# Patient Record
Sex: MALE | Race: WHITE | URBAN - METROPOLITAN AREA
[De-identification: names, ages, dates, MRNs, and addresses within clinical notes are randomized per-mention and may not be internally consistent; named-entity substitution may affect disease eponyms.]

---

## 2022-01-01 ENCOUNTER — HOSPITAL ENCOUNTER (INPATIENT)
Age: 62
LOS: 1 days | DRG: 045 | End: 2022-06-03
Attending: EMERGENCY MEDICINE | Admitting: SURGERY
Payer: MEDICAID

## 2022-01-01 ENCOUNTER — APPOINTMENT (OUTPATIENT)
Dept: GENERAL RADIOLOGY | Age: 62
DRG: 045 | End: 2022-01-01
Payer: MEDICAID

## 2022-01-01 ENCOUNTER — APPOINTMENT (OUTPATIENT)
Dept: CT IMAGING | Age: 62
DRG: 045 | End: 2022-01-01
Payer: MEDICAID

## 2022-01-01 VITALS
BODY MASS INDEX: 29.52 KG/M2 | OXYGEN SATURATION: 98 % | HEIGHT: 74 IN | WEIGHT: 230 LBS | DIASTOLIC BLOOD PRESSURE: 61 MMHG | TEMPERATURE: 97.7 F | RESPIRATION RATE: 24 BRPM | SYSTOLIC BLOOD PRESSURE: 103 MMHG | HEART RATE: 88 BPM

## 2022-01-01 DIAGNOSIS — I63.9 ACUTE CEREBROVASCULAR ACCIDENT (HCC): Primary | ICD-10-CM

## 2022-01-01 LAB
AADO2: 310.1 MMHG
AADO2: 315 MMHG
AADO2: 321.8 MMHG
AADO2: 336.4 MMHG
ABO/RH: NORMAL
ACETAMINOPHEN LEVEL: 5.1 MCG/ML (ref 10–30)
ALBUMIN SERPL-MCNC: 2.9 G/DL (ref 3.5–5.2)
ALBUMIN SERPL-MCNC: 3.6 G/DL (ref 3.5–5.2)
ALP BLD-CCNC: 72 U/L (ref 40–129)
ALP BLD-CCNC: 72 U/L (ref 40–129)
ALT SERPL-CCNC: 110 U/L (ref 0–40)
ALT SERPL-CCNC: 1308 U/L (ref 0–40)
AMORPHOUS: ABNORMAL
AMPHETAMINE SCREEN, URINE: NOT DETECTED
ANGLE (CLOT STRENGTH): 64.1 DEGREE (ref 59–74)
ANION GAP SERPL CALCULATED.3IONS-SCNC: 15 MMOL/L (ref 7–16)
ANION GAP SERPL CALCULATED.3IONS-SCNC: 22 MMOL/L (ref 7–16)
ANISOCYTOSIS: ABNORMAL
ANTIBODY SCREEN: NORMAL
APTT: 31 SEC (ref 24.5–35.1)
AST SERPL-CCNC: 2531 U/L (ref 0–39)
AST SERPL-CCNC: 277 U/L (ref 0–39)
B.E.: -13.5 MMOL/L (ref -3–3)
B.E.: -13.9 MMOL/L (ref -3–3)
B.E.: -14.3 MMOL/L (ref -3–3)
B.E.: -15.3 MMOL/L (ref -3–3)
B.E.: -15.6 MMOL/L (ref -3–3)
BACTERIA: ABNORMAL /HPF
BARBITURATE SCREEN URINE: NOT DETECTED
BASOPHILS ABSOLUTE: 0 E9/L (ref 0–0.2)
BASOPHILS RELATIVE PERCENT: 0.5 % (ref 0–2)
BENZODIAZEPINE SCREEN, URINE: POSITIVE
BILIRUB SERPL-MCNC: 0.2 MG/DL (ref 0–1.2)
BILIRUB SERPL-MCNC: 1.2 MG/DL (ref 0–1.2)
BILIRUBIN URINE: NEGATIVE
BLOOD, URINE: ABNORMAL
BUN BLDV-MCNC: 58 MG/DL (ref 6–23)
BUN BLDV-MCNC: 69 MG/DL (ref 6–23)
BURR CELLS: ABNORMAL
CALCIUM IONIZED: 1.04 MMOL/L (ref 1.15–1.33)
CALCIUM IONIZED: 1.09 MMOL/L (ref 1.15–1.33)
CALCIUM SERPL-MCNC: 7.2 MG/DL (ref 8.6–10.2)
CALCIUM SERPL-MCNC: 7.8 MG/DL (ref 8.6–10.2)
CANNABINOID SCREEN URINE: NOT DETECTED
CHLORIDE BLD-SCNC: 111 MMOL/L (ref 98–107)
CHLORIDE BLD-SCNC: 116 MMOL/L (ref 98–107)
CLARITY: CLEAR
CO2: 15 MMOL/L (ref 22–29)
CO2: 19 MMOL/L (ref 22–29)
COCAINE METABOLITE SCREEN URINE: NOT DETECTED
COHB: 0.2 % (ref 0–1.5)
COHB: 0.3 % (ref 0–1.5)
COHB: 0.5 % (ref 0–1.5)
COHB: 0.5 % (ref 0–1.5)
COHB: 0.7 % (ref 0–1.5)
COLOR: YELLOW
CREAT SERPL-MCNC: 5.6 MG/DL (ref 0.7–1.2)
CREAT SERPL-MCNC: 7.2 MG/DL (ref 0.7–1.2)
CREATININE URINE: 274 MG/DL (ref 40–278)
CRITICAL: ABNORMAL
DATE ANALYZED: ABNORMAL
DATE OF COLLECTION: ABNORMAL
EOSINOPHILS ABSOLUTE: 0 E9/L (ref 0.05–0.5)
EOSINOPHILS RELATIVE PERCENT: 0.1 % (ref 0–6)
EPL-TEG: 0 % (ref 0–15)
ETHANOL: <10 MG/DL (ref 0–0.08)
FENTANYL SCREEN, URINE: POSITIVE
FIO2: 100 %
FIO2: 70 %
G-TEG: 8.8 K D/SC (ref 4.5–11)
GFR AFRICAN AMERICAN: 12
GFR AFRICAN AMERICAN: 9
GFR NON-AFRICAN AMERICAN: 10 ML/MIN/1.73
GFR NON-AFRICAN AMERICAN: 8 ML/MIN/1.73
GLUCOSE BLD-MCNC: 236 MG/DL (ref 74–99)
GLUCOSE BLD-MCNC: 239 MG/DL (ref 74–99)
GLUCOSE URINE: 250 MG/DL
HBA1C MFR BLD: 9.1 % (ref 4–5.6)
HCO3: 14 MMOL/L (ref 22–26)
HCO3: 14.3 MMOL/L (ref 22–26)
HCO3: 14.4 MMOL/L (ref 22–26)
HCO3: 14.4 MMOL/L (ref 22–26)
HCO3: 17.6 MMOL/L (ref 22–26)
HCT VFR BLD CALC: 44.7 % (ref 37–54)
HCT VFR BLD CALC: 45 % (ref 37–54)
HEMOGLOBIN: 13.3 G/DL (ref 12.5–16.5)
HEMOGLOBIN: 13.5 G/DL (ref 12.5–16.5)
HHB: 1.1 % (ref 0–5)
HHB: 2 % (ref 0–5)
HHB: 2.3 % (ref 0–5)
HHB: 3.2 % (ref 0–5)
HHB: 3.5 % (ref 0–5)
INR BLD: 1.4
K (CLOTTING TIME): 1.9 MIN (ref 1–3)
KETONES, URINE: ABNORMAL MG/DL
LAB: ABNORMAL
LACTIC ACID: 2.5 MMOL/L (ref 0.5–2.2)
LACTIC ACID: 3.7 MMOL/L (ref 0.5–2.2)
LACTIC ACID: 4.8 MMOL/L (ref 0.5–2.2)
LEUKOCYTE ESTERASE, URINE: ABNORMAL
LY30 (FIBRINOLYSIS): 0 % (ref 0–8)
LYMPHOCYTES ABSOLUTE: 0.94 E9/L (ref 1.5–4)
LYMPHOCYTES RELATIVE PERCENT: 6.9 % (ref 20–42)
Lab: ABNORMAL
MA (MAX AMPLITUDE): 63.8 MM (ref 50–70)
MAGNESIUM: 2.4 MG/DL (ref 1.6–2.6)
MAGNESIUM: 2.5 MG/DL (ref 1.6–2.6)
MCH RBC QN AUTO: 25.8 PG (ref 26–35)
MCH RBC QN AUTO: 26.2 PG (ref 26–35)
MCHC RBC AUTO-ENTMCNC: 29.6 % (ref 32–34.5)
MCHC RBC AUTO-ENTMCNC: 30.2 % (ref 32–34.5)
MCV RBC AUTO: 86.8 FL (ref 80–99.9)
MCV RBC AUTO: 87.4 FL (ref 80–99.9)
METAMYELOCYTES RELATIVE PERCENT: 0.9 % (ref 0–1)
METER GLUCOSE: 187 MG/DL (ref 74–99)
METER GLUCOSE: 237 MG/DL (ref 74–99)
METER GLUCOSE: 290 MG/DL (ref 74–99)
METHADONE SCREEN, URINE: NOT DETECTED
METHB: 0.4 % (ref 0–1.5)
METHB: 0.5 % (ref 0–1.5)
METHB: 0.8 % (ref 0–1.5)
MODE: AC
MONOCYTES ABSOLUTE: 0.54 E9/L (ref 0.1–0.95)
MONOCYTES RELATIVE PERCENT: 4.3 % (ref 2–12)
NEUTROPHILS ABSOLUTE: 11.93 E9/L (ref 1.8–7.3)
NEUTROPHILS RELATIVE PERCENT: 87.9 % (ref 43–80)
NITRITE, URINE: NEGATIVE
NUCLEATED RED BLOOD CELLS: 3.4 /100 WBC
O2 CONTENT: 20.1 ML/DL
O2 SATURATION: 96.5 % (ref 92–98.5)
O2 SATURATION: 96.8 % (ref 92–98.5)
O2 SATURATION: 97.7 % (ref 92–98.5)
O2 SATURATION: 98 % (ref 92–98.5)
O2 SATURATION: 98.9 % (ref 92–98.5)
O2HB: 95.8 % (ref 94–97)
O2HB: 95.8 % (ref 94–97)
O2HB: 96.5 % (ref 94–97)
O2HB: 97.1 % (ref 94–97)
O2HB: 97.8 % (ref 94–97)
OPERATOR ID: 1721
OPERATOR ID: 1721
OPERATOR ID: 2021
OPERATOR ID: 421
OPERATOR ID: ABNORMAL
OPIATE SCREEN URINE: POSITIVE
OXYCODONE URINE: NOT DETECTED
PATIENT TEMP: 37 C
PCO2: 40 MMHG (ref 35–45)
PCO2: 44.1 MMHG (ref 35–45)
PCO2: 48 MMHG (ref 35–45)
PCO2: 50.3 MMHG (ref 35–45)
PCO2: 64.3 MMHG (ref 35–45)
PDW BLD-RTO: 14.7 FL (ref 11.5–15)
PDW BLD-RTO: 14.9 FL (ref 11.5–15)
PEEP/CPAP: 5 CMH2O
PEEP/CPAP: 8 CMH2O
PFO2: 1.4 MMHG/%
PFO2: 1.51 MMHG/%
PFO2: 1.64 MMHG/%
PFO2: 1.84 MMHG/%
PFO2: 1.95 MMHG/%
PH BLOOD GAS: 7.05 (ref 7.35–7.45)
PH BLOOD GAS: 7.08 (ref 7.35–7.45)
PH BLOOD GAS: 7.09 (ref 7.35–7.45)
PH BLOOD GAS: 7.13 (ref 7.35–7.45)
PH BLOOD GAS: 7.16 (ref 7.35–7.45)
PH UA: 5 (ref 5–9)
PHENCYCLIDINE SCREEN URINE: NOT DETECTED
PHOSPHORUS: 6.7 MG/DL (ref 2.5–4.5)
PHOSPHORUS: 7.6 MG/DL (ref 2.5–4.5)
PLATELET # BLD: 144 E9/L (ref 130–450)
PLATELET # BLD: 28 E9/L (ref 130–450)
PLATELET CONFIRMATION: NORMAL
PMV BLD AUTO: 11.8 FL (ref 7–12)
PMV BLD AUTO: 9.6 FL (ref 7–12)
PO2: 105.7 MMHG (ref 75–100)
PO2: 115 MMHG (ref 75–100)
PO2: 128.5 MMHG (ref 75–100)
PO2: 195.1 MMHG (ref 75–100)
PO2: 97.8 MMHG (ref 75–100)
POIKILOCYTES: ABNORMAL
POLYCHROMASIA: ABNORMAL
POTASSIUM REFLEX MAGNESIUM: 3.7 MMOL/L (ref 3.5–5)
POTASSIUM SERPL-SCNC: 4.25 MMOL/L (ref 3.5–5)
POTASSIUM SERPL-SCNC: 4.5 MMOL/L (ref 3.5–5)
PROTEIN UA: >=300 MG/DL
PROTHROMBIN TIME: 15.7 SEC (ref 9.3–12.4)
R (REACTION TIME): 4.6 MIN (ref 5–10)
RBC # BLD: 5.15 E12/L (ref 3.8–5.8)
RBC # BLD: 5.15 E12/L (ref 3.8–5.8)
RBC UA: ABNORMAL /HPF (ref 0–2)
RI(T): 2.41
RI(T): 2.74
RI(T): 3.04
RI(T): 3.44
RR MECHANICAL: 20 B/MIN
RR MECHANICAL: 24 B/MIN
SALICYLATE, SERUM: <0.3 MG/DL (ref 0–30)
SODIUM BLD-SCNC: 144 MMOL/L (ref 132–146)
SODIUM BLD-SCNC: 145 MMOL/L (ref 132–146)
SODIUM BLD-SCNC: 146 MMOL/L (ref 132–146)
SODIUM BLD-SCNC: 153 MMOL/L (ref 132–146)
SODIUM URINE: 57 MMOL/L
SOURCE, BLOOD GAS: ABNORMAL
SPECIFIC GRAVITY UA: >=1.03 (ref 1–1.03)
THB: 13.4 G/DL (ref 11.5–16.5)
THB: 14.2 G/DL (ref 11.5–16.5)
THB: 14.3 G/DL (ref 11.5–16.5)
THB: 14.8 G/DL (ref 11.5–16.5)
THB: 14.8 G/DL (ref 11.5–16.5)
TIME ANALYZED: 1550
TIME ANALYZED: 2050
TIME ANALYZED: 531
TIME ANALYZED: 9
TIME ANALYZED: 955
TOTAL PROTEIN: 5.8 G/DL (ref 6.4–8.3)
TOTAL PROTEIN: 6.8 G/DL (ref 6.4–8.3)
TRICYCLIC ANTIDEPRESSANTS SCREEN SERUM: NEGATIVE NG/ML
UROBILINOGEN, URINE: 0.2 E.U./DL
VANCOMYCIN RANDOM: <4 MCG/ML (ref 5–40)
VT MECHANICAL: 500 ML
WBC # BLD: 13.4 E9/L (ref 4.5–11.5)
WBC # BLD: 28.2 E9/L (ref 4.5–11.5)
WBC UA: ABNORMAL /HPF (ref 0–5)

## 2022-01-01 PROCEDURE — 80307 DRUG TEST PRSMV CHEM ANLYZR: CPT

## 2022-01-01 PROCEDURE — 82077 ASSAY SPEC XCP UR&BREATH IA: CPT

## 2022-01-01 PROCEDURE — 82570 ASSAY OF URINE CREATININE: CPT

## 2022-01-01 PROCEDURE — 94002 VENT MGMT INPAT INIT DAY: CPT

## 2022-01-01 PROCEDURE — 84100 ASSAY OF PHOSPHORUS: CPT

## 2022-01-01 PROCEDURE — 80202 ASSAY OF VANCOMYCIN: CPT

## 2022-01-01 PROCEDURE — 87081 CULTURE SCREEN ONLY: CPT

## 2022-01-01 PROCEDURE — 6360000002 HC RX W HCPCS: Performed by: STUDENT IN AN ORGANIZED HEALTH CARE EDUCATION/TRAINING PROGRAM

## 2022-01-01 PROCEDURE — 80179 DRUG ASSAY SALICYLATE: CPT

## 2022-01-01 PROCEDURE — 71045 X-RAY EXAM CHEST 1 VIEW: CPT

## 2022-01-01 PROCEDURE — 2580000003 HC RX 258: Performed by: STUDENT IN AN ORGANIZED HEALTH CARE EDUCATION/TRAINING PROGRAM

## 2022-01-01 PROCEDURE — 82962 GLUCOSE BLOOD TEST: CPT

## 2022-01-01 PROCEDURE — 85027 COMPLETE CBC AUTOMATED: CPT

## 2022-01-01 PROCEDURE — 82330 ASSAY OF CALCIUM: CPT

## 2022-01-01 PROCEDURE — 6370000000 HC RX 637 (ALT 250 FOR IP): Performed by: SURGERY

## 2022-01-01 PROCEDURE — 80143 DRUG ASSAY ACETAMINOPHEN: CPT

## 2022-01-01 PROCEDURE — 2500000003 HC RX 250 WO HCPCS: Performed by: STUDENT IN AN ORGANIZED HEALTH CARE EDUCATION/TRAINING PROGRAM

## 2022-01-01 PROCEDURE — 2580000003 HC RX 258: Performed by: SURGERY

## 2022-01-01 PROCEDURE — 86850 RBC ANTIBODY SCREEN: CPT

## 2022-01-01 PROCEDURE — 85025 COMPLETE CBC W/AUTO DIFF WBC: CPT

## 2022-01-01 PROCEDURE — 2500000003 HC RX 250 WO HCPCS: Performed by: SURGERY

## 2022-01-01 PROCEDURE — 96365 THER/PROPH/DIAG IV INF INIT: CPT

## 2022-01-01 PROCEDURE — 83735 ASSAY OF MAGNESIUM: CPT

## 2022-01-01 PROCEDURE — 72125 CT NECK SPINE W/O DYE: CPT

## 2022-01-01 PROCEDURE — 0BH17EZ INSERTION OF ENDOTRACHEAL AIRWAY INTO TRACHEA, VIA NATURAL OR ARTIFICIAL OPENING: ICD-10-PCS | Performed by: SURGERY

## 2022-01-01 PROCEDURE — 5A1935Z RESPIRATORY VENTILATION, LESS THAN 24 CONSECUTIVE HOURS: ICD-10-PCS | Performed by: SURGERY

## 2022-01-01 PROCEDURE — 85384 FIBRINOGEN ACTIVITY: CPT

## 2022-01-01 PROCEDURE — 36620 INSERTION CATHETER ARTERY: CPT

## 2022-01-01 PROCEDURE — 99232 SBSQ HOSP IP/OBS MODERATE 35: CPT | Performed by: NEUROLOGICAL SURGERY

## 2022-01-01 PROCEDURE — 80053 COMPREHEN METABOLIC PANEL: CPT

## 2022-01-01 PROCEDURE — 99222 1ST HOSP IP/OBS MODERATE 55: CPT | Performed by: NEUROLOGICAL SURGERY

## 2022-01-01 PROCEDURE — 85610 PROTHROMBIN TIME: CPT

## 2022-01-01 PROCEDURE — 82805 BLOOD GASES W/O2 SATURATION: CPT

## 2022-01-01 PROCEDURE — 6370000000 HC RX 637 (ALT 250 FOR IP): Performed by: STUDENT IN AN ORGANIZED HEALTH CARE EDUCATION/TRAINING PROGRAM

## 2022-01-01 PROCEDURE — 02HV33Z INSERTION OF INFUSION DEVICE INTO SUPERIOR VENA CAVA, PERCUTANEOUS APPROACH: ICD-10-PCS | Performed by: SURGERY

## 2022-01-01 PROCEDURE — 05HN33Z INSERTION OF INFUSION DEVICE INTO LEFT INTERNAL JUGULAR VEIN, PERCUTANEOUS APPROACH: ICD-10-PCS | Performed by: SURGERY

## 2022-01-01 PROCEDURE — 99285 EMERGENCY DEPT VISIT HI MDM: CPT

## 2022-01-01 PROCEDURE — 37799 UNLISTED PX VASCULAR SURGERY: CPT

## 2022-01-01 PROCEDURE — 86900 BLOOD TYPING SEROLOGIC ABO: CPT

## 2022-01-01 PROCEDURE — 99223 1ST HOSP IP/OBS HIGH 75: CPT | Performed by: SURGERY

## 2022-01-01 PROCEDURE — 84295 ASSAY OF SERUM SODIUM: CPT

## 2022-01-01 PROCEDURE — 36415 COLL VENOUS BLD VENIPUNCTURE: CPT

## 2022-01-01 PROCEDURE — 86901 BLOOD TYPING SEROLOGIC RH(D): CPT

## 2022-01-01 PROCEDURE — 72170 X-RAY EXAM OF PELVIS: CPT

## 2022-01-01 PROCEDURE — 84132 ASSAY OF SERUM POTASSIUM: CPT

## 2022-01-01 PROCEDURE — 83036 HEMOGLOBIN GLYCOSYLATED A1C: CPT

## 2022-01-01 PROCEDURE — 83605 ASSAY OF LACTIC ACID: CPT

## 2022-01-01 PROCEDURE — 6810039001 HC L1 TRAUMA PRIORITY

## 2022-01-01 PROCEDURE — 94003 VENT MGMT INPAT SUBQ DAY: CPT

## 2022-01-01 PROCEDURE — 84300 ASSAY OF URINE SODIUM: CPT

## 2022-01-01 PROCEDURE — 81001 URINALYSIS AUTO W/SCOPE: CPT

## 2022-01-01 PROCEDURE — 36556 INSERT NON-TUNNEL CV CATH: CPT

## 2022-01-01 PROCEDURE — 85347 COAGULATION TIME ACTIVATED: CPT

## 2022-01-01 PROCEDURE — 87088 URINE BACTERIA CULTURE: CPT

## 2022-01-01 PROCEDURE — 85730 THROMBOPLASTIN TIME PARTIAL: CPT

## 2022-01-01 PROCEDURE — 70450 CT HEAD/BRAIN W/O DYE: CPT

## 2022-01-01 PROCEDURE — 71250 CT THORAX DX C-: CPT

## 2022-01-01 PROCEDURE — 6360000002 HC RX W HCPCS: Performed by: SURGERY

## 2022-01-01 PROCEDURE — 74176 CT ABD & PELVIS W/O CONTRAST: CPT

## 2022-01-01 PROCEDURE — 2000000000 HC ICU R&B

## 2022-01-01 PROCEDURE — 99238 HOSP IP/OBS DSCHRG MGMT 30/<: CPT | Performed by: SURGERY

## 2022-01-01 PROCEDURE — 85576 BLOOD PLATELET AGGREGATION: CPT

## 2022-01-01 PROCEDURE — 87040 BLOOD CULTURE FOR BACTERIA: CPT

## 2022-01-01 PROCEDURE — 99291 CRITICAL CARE FIRST HOUR: CPT | Performed by: SURGERY

## 2022-01-01 RX ORDER — ONDANSETRON 4 MG/1
4 TABLET, ORALLY DISINTEGRATING ORAL EVERY 8 HOURS PRN
Status: DISCONTINUED | OUTPATIENT
Start: 2022-01-01 | End: 2022-01-01 | Stop reason: HOSPADM

## 2022-01-01 RX ORDER — DOCUSATE SODIUM 50 MG/5ML
100 LIQUID ORAL DAILY
Status: DISCONTINUED | OUTPATIENT
Start: 2022-01-01 | End: 2022-01-01

## 2022-01-01 RX ORDER — 3% SODIUM CHLORIDE 3 G/100ML
35 INJECTION, SOLUTION INTRAVENOUS CONTINUOUS
Status: DISCONTINUED | OUTPATIENT
Start: 2022-01-01 | End: 2022-01-01

## 2022-01-01 RX ORDER — 0.9 % SODIUM CHLORIDE 0.9 %
500 INTRAVENOUS SOLUTION INTRAVENOUS ONCE
Status: COMPLETED | OUTPATIENT
Start: 2022-01-01 | End: 2022-01-01

## 2022-01-01 RX ORDER — VASOPRESSIN 20 U/ML
INJECTION PARENTERAL
Status: DISCONTINUED
Start: 2022-01-01 | End: 2022-01-01

## 2022-01-01 RX ORDER — POLYVINYL ALCOHOL 14 MG/ML
1 SOLUTION/ DROPS OPHTHALMIC EVERY 4 HOURS
Status: DISCONTINUED | OUTPATIENT
Start: 2022-01-01 | End: 2022-01-01 | Stop reason: HOSPADM

## 2022-01-01 RX ORDER — GLYCOPYRROLATE 0.2 MG/ML
0.2 INJECTION INTRAMUSCULAR; INTRAVENOUS EVERY 4 HOURS PRN
Status: DISCONTINUED | OUTPATIENT
Start: 2022-01-01 | End: 2022-01-01 | Stop reason: HOSPADM

## 2022-01-01 RX ORDER — ONDANSETRON 2 MG/ML
4 INJECTION INTRAMUSCULAR; INTRAVENOUS EVERY 6 HOURS PRN
Status: DISCONTINUED | OUTPATIENT
Start: 2022-01-01 | End: 2022-01-01 | Stop reason: HOSPADM

## 2022-01-01 RX ORDER — POLYETHYLENE GLYCOL 3350 17 G/17G
17 POWDER, FOR SOLUTION ORAL DAILY
Status: DISCONTINUED | OUTPATIENT
Start: 2022-01-01 | End: 2022-01-01

## 2022-01-01 RX ORDER — SODIUM CHLORIDE 0.9 % (FLUSH) 0.9 %
10 SYRINGE (ML) INJECTION PRN
Status: DISCONTINUED | OUTPATIENT
Start: 2022-01-01 | End: 2022-01-01

## 2022-01-01 RX ORDER — LEVETIRACETAM 100 MG/ML
500 SOLUTION ORAL 2 TIMES DAILY
Status: DISCONTINUED | OUTPATIENT
Start: 2022-01-01 | End: 2022-01-01

## 2022-01-01 RX ORDER — SODIUM CHLORIDE 9 MG/ML
INJECTION, SOLUTION INTRAVENOUS PRN
Status: DISCONTINUED | OUTPATIENT
Start: 2022-01-01 | End: 2022-01-01 | Stop reason: HOSPADM

## 2022-01-01 RX ORDER — INSULIN LISPRO 100 [IU]/ML
0-18 INJECTION, SOLUTION INTRAVENOUS; SUBCUTANEOUS EVERY 4 HOURS
Status: DISCONTINUED | OUTPATIENT
Start: 2022-01-01 | End: 2022-01-01

## 2022-01-01 RX ORDER — 0.9 % SODIUM CHLORIDE 0.9 %
1000 INTRAVENOUS SOLUTION INTRAVENOUS ONCE
Status: DISCONTINUED | OUTPATIENT
Start: 2022-01-01 | End: 2022-01-01

## 2022-01-01 RX ORDER — MINERAL OIL AND WHITE PETROLATUM 150; 830 MG/G; MG/G
OINTMENT OPHTHALMIC EVERY 4 HOURS
Status: DISCONTINUED | OUTPATIENT
Start: 2022-01-01 | End: 2022-01-01 | Stop reason: HOSPADM

## 2022-01-01 RX ORDER — DEXTROSE MONOHYDRATE 50 MG/ML
INJECTION, SOLUTION INTRAVENOUS
Status: COMPLETED
Start: 2022-01-01 | End: 2022-01-01

## 2022-01-01 RX ORDER — SODIUM CHLORIDE 9 MG/ML
INJECTION, SOLUTION INTRAVENOUS CONTINUOUS
Status: DISCONTINUED | OUTPATIENT
Start: 2022-01-01 | End: 2022-01-01

## 2022-01-01 RX ORDER — 3% SODIUM CHLORIDE 3 G/100ML
INJECTION, SOLUTION INTRAVENOUS CONTINUOUS PRN
Status: COMPLETED | OUTPATIENT
Start: 2022-01-01 | End: 2022-01-01

## 2022-01-01 RX ORDER — POLYVINYL ALCOHOL 14 MG/ML
1 SOLUTION/ DROPS OPHTHALMIC EVERY 4 HOURS PRN
Status: DISCONTINUED | OUTPATIENT
Start: 2022-01-01 | End: 2022-01-01

## 2022-01-01 RX ORDER — SODIUM CHLORIDE 0.9 % (FLUSH) 0.9 %
10 SYRINGE (ML) INJECTION EVERY 12 HOURS SCHEDULED
Status: DISCONTINUED | OUTPATIENT
Start: 2022-01-01 | End: 2022-01-01 | Stop reason: HOSPADM

## 2022-01-01 RX ORDER — CHLORHEXIDINE GLUCONATE 0.12 MG/ML
15 RINSE ORAL 2 TIMES DAILY
Status: DISCONTINUED | OUTPATIENT
Start: 2022-01-01 | End: 2022-01-01

## 2022-01-01 RX ORDER — ACETAMINOPHEN 160 MG/5ML
650 SOLUTION ORAL EVERY 4 HOURS PRN
Status: DISCONTINUED | OUTPATIENT
Start: 2022-01-01 | End: 2022-01-01 | Stop reason: HOSPADM

## 2022-01-01 RX ORDER — FAMOTIDINE 20 MG/1
20 TABLET, FILM COATED ORAL DAILY
Status: DISCONTINUED | OUTPATIENT
Start: 2022-01-01 | End: 2022-01-01

## 2022-01-01 RX ORDER — DEXTROSE MONOHYDRATE 50 MG/ML
100 INJECTION, SOLUTION INTRAVENOUS PRN
Status: DISCONTINUED | OUTPATIENT
Start: 2022-01-01 | End: 2022-01-01

## 2022-01-01 RX ADMIN — SODIUM CHLORIDE: 9 INJECTION, SOLUTION INTRAVENOUS at 18:39

## 2022-01-01 RX ADMIN — SODIUM CHLORIDE: 9 INJECTION, SOLUTION INTRAVENOUS at 07:21

## 2022-01-01 RX ADMIN — SODIUM CHLORIDE 500 ML: 9 INJECTION, SOLUTION INTRAVENOUS at 07:22

## 2022-01-01 RX ADMIN — EPINEPHRINE 15 MCG/MIN: 1 INJECTION PARENTERAL at 07:02

## 2022-01-01 RX ADMIN — SODIUM BICARBONATE: 84 INJECTION, SOLUTION INTRAVENOUS at 10:42

## 2022-01-01 RX ADMIN — SODIUM BICARBONATE 100 MEQ: 84 INJECTION, SOLUTION INTRAVENOUS at 00:19

## 2022-01-01 RX ADMIN — INSULIN LISPRO 9 UNITS: 100 INJECTION, SOLUTION INTRAVENOUS; SUBCUTANEOUS at 21:20

## 2022-01-01 RX ADMIN — VASOPRESSIN 0.04 UNITS/MIN: 20 INJECTION INTRAVENOUS at 19:01

## 2022-01-01 RX ADMIN — POLYVINYL ALCOHOL 1 DROP: 14 SOLUTION/ DROPS OPHTHALMIC at 10:05

## 2022-01-01 RX ADMIN — SODIUM BICARBONATE 50 MEQ: 84 INJECTION INTRAVENOUS at 21:11

## 2022-01-01 RX ADMIN — CEFEPIME HYDROCHLORIDE 1000 MG: 1 INJECTION, POWDER, FOR SOLUTION INTRAMUSCULAR; INTRAVENOUS at 04:28

## 2022-01-01 RX ADMIN — POLYVINYL ALCOHOL 1 DROP: 14 SOLUTION/ DROPS OPHTHALMIC at 04:26

## 2022-01-01 RX ADMIN — CHLORHEXIDINE GLUCONATE 15 ML: 1.2 RINSE ORAL at 10:07

## 2022-01-01 RX ADMIN — PIPERACILLIN SODIUM AND TAZOBACTAM SODIUM 4500 MG: 4; .5 INJECTION, POWDER, LYOPHILIZED, FOR SOLUTION INTRAVENOUS at 15:58

## 2022-01-01 RX ADMIN — ACETAMINOPHEN ORAL SOLUTION 650 MG: 650 SOLUTION ORAL at 21:09

## 2022-01-01 RX ADMIN — MINERAL OIL AND WHITE PETROLATUM: 150; 830 OINTMENT OPHTHALMIC at 05:39

## 2022-01-01 RX ADMIN — Medication 20 MG: at 21:09

## 2022-01-01 RX ADMIN — EPINEPHRINE 10 MCG/MIN: 1 INJECTION PARENTERAL at 15:01

## 2022-01-01 RX ADMIN — INSULIN LISPRO 3 UNITS: 100 INJECTION, SOLUTION INTRAVENOUS; SUBCUTANEOUS at 04:30

## 2022-01-01 RX ADMIN — NOREPINEPHRINE BITARTRATE 100 MCG/MIN: 1 INJECTION, SOLUTION, CONCENTRATE INTRAVENOUS at 22:00

## 2022-01-01 RX ADMIN — NOREPINEPHRINE BITARTRATE 100 MCG/MIN: 1 INJECTION, SOLUTION, CONCENTRATE INTRAVENOUS at 19:01

## 2022-01-01 RX ADMIN — EPINEPHRINE 15 MCG/MIN: 1 INJECTION PARENTERAL at 00:56

## 2022-01-01 RX ADMIN — SODIUM CHLORIDE 500 ML: 9 INJECTION, SOLUTION INTRAVENOUS at 18:54

## 2022-01-01 RX ADMIN — SODIUM BICARBONATE: 84 INJECTION, SOLUTION INTRAVENOUS at 21:58

## 2022-01-01 RX ADMIN — NOREPINEPHRINE BITARTRATE 50 MCG/MIN: 1 INJECTION, SOLUTION, CONCENTRATE INTRAVENOUS at 09:50

## 2022-01-01 RX ADMIN — CALCIUM GLUCONATE 4000 MG: 98 INJECTION, SOLUTION INTRAVENOUS at 07:47

## 2022-01-01 RX ADMIN — NOREPINEPHRINE BITARTRATE 95 MCG/MIN: 1 INJECTION, SOLUTION, CONCENTRATE INTRAVENOUS at 00:57

## 2022-01-01 RX ADMIN — CHLORHEXIDINE GLUCONATE 15 ML: 1.2 RINSE ORAL at 21:00

## 2022-01-01 RX ADMIN — EPINEPHRINE 10 MCG/MIN: 1 INJECTION PARENTERAL at 19:03

## 2022-01-01 RX ADMIN — SODIUM CHLORIDE 35 ML/HR: 3 INJECTION, SOLUTION INTRAVENOUS at 15:36

## 2022-01-01 RX ADMIN — MINERAL OIL AND WHITE PETROLATUM: 150; 830 OINTMENT OPHTHALMIC at 10:05

## 2022-01-01 RX ADMIN — NOREPINEPHRINE BITARTRATE 75 MCG/MIN: 1 INJECTION, SOLUTION, CONCENTRATE INTRAVENOUS at 04:24

## 2022-01-01 RX ADMIN — CALCIUM GLUCONATE 2000 MG: 98 INJECTION, SOLUTION INTRAVENOUS at 21:14

## 2022-01-01 RX ADMIN — SODIUM BICARBONATE: 84 INJECTION, SOLUTION INTRAVENOUS at 10:15

## 2022-01-01 RX ADMIN — INSULIN LISPRO 6 UNITS: 100 INJECTION, SOLUTION INTRAVENOUS; SUBCUTANEOUS at 19:10

## 2022-01-01 RX ADMIN — INSULIN LISPRO 6 UNITS: 100 INJECTION, SOLUTION INTRAVENOUS; SUBCUTANEOUS at 00:40

## 2022-01-01 RX ADMIN — VASOPRESSIN 0.04 UNITS/MIN: 20 INJECTION INTRAVENOUS at 18:46

## 2022-01-01 RX ADMIN — INSULIN LISPRO 6 UNITS: 100 INJECTION, SOLUTION INTRAVENOUS; SUBCUTANEOUS at 08:40

## 2022-01-01 RX ADMIN — Medication 100 MCG/MIN: at 14:55

## 2022-01-01 RX ADMIN — VASOPRESSIN 0.04 UNITS/MIN: 20 INJECTION INTRAVENOUS at 03:23

## 2022-01-01 RX ADMIN — SODIUM CHLORIDE 35 ML/HR: 3 INJECTION, SOLUTION INTRAVENOUS at 21:54

## 2022-01-01 RX ADMIN — POLYVINYL ALCOHOL 1 DROP: 14 SOLUTION/ DROPS OPHTHALMIC at 05:39

## 2022-01-01 RX ADMIN — SODIUM BICARBONATE 50 MEQ: 84 INJECTION, SOLUTION INTRAVENOUS at 06:26

## 2022-01-01 RX ADMIN — SODIUM CHLORIDE 250 ML: 3 INJECTION, SOLUTION INTRAVENOUS at 15:02

## 2022-01-01 RX ADMIN — Medication 105 MCG/MIN: at 17:55

## 2022-01-01 RX ADMIN — MINERAL OIL AND WHITE PETROLATUM: 150; 830 OINTMENT OPHTHALMIC at 02:00

## 2022-01-01 ASSESSMENT — PULMONARY FUNCTION TESTS
PIF_VALUE: 22
PIF_VALUE: 23
PIF_VALUE: 22
PIF_VALUE: 22
PIF_VALUE: 23
PIF_VALUE: 22
PIF_VALUE: 22
PIF_VALUE: 23
PIF_VALUE: 22
PIF_VALUE: 22
PIF_VALUE: 23
PIF_VALUE: 22
PIF_VALUE: 22
PIF_VALUE: 23
PIF_VALUE: 23
PIF_VALUE: 22
PIF_VALUE: 23
PIF_VALUE: 22
PIF_VALUE: 23
PIF_VALUE: 22
PIF_VALUE: 23
PIF_VALUE: 22
PIF_VALUE: 22
PIF_VALUE: 23
PIF_VALUE: 23
PIF_VALUE: 22
PIF_VALUE: 23
PIF_VALUE: 22

## 2022-01-01 ASSESSMENT — PAIN SCALES - GENERAL
PAINLEVEL_OUTOF10: 0

## 2022-06-02 PROBLEM — I63.9 ACUTE CEREBROVASCULAR ACCIDENT (HCC): Status: ACTIVE | Noted: 2022-01-01

## 2022-06-02 PROBLEM — I62.9 INTRACRANIAL HEMORRHAGE (HCC): Status: ACTIVE | Noted: 2022-01-01

## 2022-06-02 PROBLEM — T14.90XA TRAUMA: Status: ACTIVE | Noted: 2022-01-01

## 2022-06-02 NOTE — ED NOTES
Pt log-rolled while maintaining c-spine precautions. No-step-off, deformities, or signs of trauma.  No rectal tone     Jace Salvador RN  06/02/22 2446

## 2022-06-02 NOTE — PROGRESS NOTES
Diabetic alert ID band found by nursing in patient belongings. On back there was a name and phone number of possible emergency contact. Number called but there was no voicemail box to leave message and automated answer said number not in service. Will continue to attempt to find emergency contact for patient to update on status and clarify goals of care/code status.     Electronically signed by Jose Luis Multani DO on 6/2/2022 at 6:55 PM

## 2022-06-02 NOTE — PROCEDURES
Balbina Young is a 60 y.o. male patient. No diagnosis found. No past medical history on file. Blood pressure (!) 72/58, pulse 88, temperature (!) 104.4 °F (40.2 °C), temperature source Axillary, resp. rate 20, weight 189 lb 9.5 oz (86 kg), SpO2 100 %. Insert Arterial Line    Date/Time: 6/2/2022 3:57 PM  Performed by: Ayla Cadena MD  Authorized by: Ricardo Zhu MD     Consent:     Consent obtained:  Emergent situation  Indications:     Indications: hemodynamic monitoring and multiple ABGs    Pre-procedure details:     Skin preparation:  2% Chlorhexidine    Preparation: Patient was prepped and draped in sterile fashion    Procedure details:     Location:  R radial    Needle gauge:  18 G    Placement technique:  Seldinger    Number of attempts:  1    Transducer: waveform confirmed    Post-procedure details:     Post-procedure:  Biopatch applied and sutured    CMS:  Unchanged    Patient tolerance of procedure: Tolerated well, no immediate complications    Dr. Kimmy Marshall was immediately available throughout the entire procedure.     Melissa Souza MD  6/2/2022

## 2022-06-02 NOTE — PLAN OF CARE
Problem: Discharge Planning  Goal: Discharge to home or other facility with appropriate resources  Outcome: Not Progressing     Problem: Pain  Goal: Verbalizes/displays adequate comfort level or baseline comfort level  Outcome: Not Progressing     Problem: Neurosensory - Adult  Goal: Achieves stable or improved neurological status  Outcome: Not Progressing     Problem: Respiratory - Adult  Goal: Achieves optimal ventilation and oxygenation  Outcome: Not Progressing     Problem: Cardiovascular - Adult  Goal: Maintains optimal cardiac output and hemodynamic stability  Outcome: Not Progressing     Problem: Infection - Adult  Goal: Absence of infection at discharge  Outcome: Not Progressing  Goal: Absence of infection during hospitalization  Outcome: Not Progressing     Problem: Metabolic/Fluid and Electrolytes - Adult  Goal: Electrolytes maintained within normal limits  Outcome: Not Progressing  Goal: Hemodynamic stability and optimal renal function maintained  Outcome: Not Progressing     Problem: Hematologic - Adult  Goal: Maintains hematologic stability  Outcome: Not Progressing

## 2022-06-02 NOTE — PROCEDURES
PROCEDURE  6/2/22       Time: 9357    CENTRAL LINE INSERTION  Risks, benefits and alternatives (for applicable procedures below) described. Performed By: Osman John MD.    Indication: poor peripheral access and need for frequent blood draws. Informed consent: Consent unable to be obtained due to the emergent nature of this procedure. .  Procedure: After routine sterile preparation, local anesthesia not performed due to emergent nature of this procedure. A left 3-Lumen 7F Central Venous Catheter was placed by subclavian vein approach and secured by standard fashion. Ultrasound Guidance:   not used. Number of Attempts: 2  Post-procedure Findings: A post procedural chest x-ray  was ordered and showed good line position. Patient tolerated the procedure well.

## 2022-06-02 NOTE — ED NOTES
Epi started at 10 mcg/min     Blake Thrasher, [facility]0 [facility]  06/02/22 1911 [facility] Myla Hou RN  06/02/22 [id number]
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
## 2022-06-02 NOTE — PROGRESS NOTES
Pharmacy Consultation Note  (Antibiotic Dosing and Monitoring)    Initial consult date: 6/2/22  Consulting physician/provider: Colleen Wei MD  Drug: Vancomycin  Indication: Aspiration Pneumonia     Age/  Gender Height Weight IBW  Allergy Information   62 y.o./male   189 lb 9.5 oz (86 kg)     Patient height not recorded   Patient has no allergy information on record. Renal Function:  Recent Labs     06/02/22  1445   BUN 58*   CREATININE 5.6*     No intake or output data in the 24 hours ending 06/02/22 1628    Vancomycin Monitoring:  Trough:  No results for input(s): VANCOTROUGH in the last 72 hours. Random:  No results for input(s): VANCORANDOM in the last 72 hours. Vancomycin Administration Times:  Recent vancomycin administrations      No vancomycin IV orders with administrations found. Orders not given:          vancomycin (VANCOCIN) 1,750 mg in dextrose 5 % 500 mL IVPB                Assessment:  · Patient is a 60 y.o. male who has been initiated on vancomycin  · CrCl cannot be calculated (Unknown ideal weight. ).   · To dose vancomycin, pharmacy will be utilizing dosing based off of levels because of patient's renal impairment/insufficiency    Plan:  · Give vancomycin 1750 mg IV x1  · Will check vancomycin level tomorrow AM  · Will continue to monitor renal function   · Clinical pharmacy to follow      Yusef Renee [facility] 6/2/2022 4:28 PM

## 2022-06-02 NOTE — ED PROVIDER NOTES
HPI:  6/2/22,   Time: 3:07 PM EDT       Artemio Tillman is a 60 y.o. male presenting to the ED for fall/head bleed, beginning unk ago. The complaint has been persistent, severe in severity, and worsened by nothing. Trauma team on arrival, transfer from [place], found this am in grass and dirt in bed. Unresponsive, intubated this am at [facility]. Found to have severe head bleed, unresponsive w/o sedation. On pressors. Transported by [employer]    Review of Systems:   Pertinent positives and negatives are stated within HPI, all other systems reviewed and are negative.          --------------------------------------------- PAST HISTORY ---------------------------------------------  Past Medical History:  has no past medical history on file. Past Surgical History:  has no past surgical history on file. Social History:      Family History: family history is not on file. The patients home medications have been reviewed. Allergies: Patient has no allergy information on record.        ---------------------------------------------------PHYSICAL EXAM--------------------------------------    Constitutional/General: comatose  Head: Normocephalic and atraumatic  Eyes: pupils NR  Mouth: ett in p lace  Neck: Supple, trach midline, placed in c collar on arrival  Respiratory: Lungs clear to auscultation bilaterally, no wheezes, rales, or rhonchi. Not in respiratory distress  Cardiovascular:  Regular rate. Regular rhythm. No murmurs, gallops, or rubs. 2+ distal pulses  Chest: No chest wall tenderness  GI:  Abdomen Soft, Non tender, Non distended. .   Musculoskeletal: no deformity, I/o left shin, no movement  Integument: skin warm and dry. No rashes. Lymphatic: no lymphadenopathy noted  Neurologic: GCS 3, no movement      -------------------------------------------------- RESULTS -------------------------------------------------  I have personally reviewed all laboratory and imaging results for this patient.  Results are listed below.      LABS:  Results for orders placed or performed during the hospital encounter of 06/02/22   Comprehensive Metabolic Panel   Result Value Ref Range    Sodium 145 132 - 146 mmol/L    Potassium 4.5 3.5 - 5.0 mmol/L    Chloride 111 (H) 98 - 107 mmol/L    CO2 19 (L) 22 - 29 mmol/L    Anion Gap 15 7 - 16 mmol/L    Glucose 236 (H) 74 - 99 mg/dL    BUN 58 (H) 6 - 23 mg/dL    CREATININE 5.6 (H) 0.7 - 1.2 mg/dL    GFR Non-African American 10 >=60 mL/min/1.73    GFR African American 12     Calcium 7.8 (L) 8.6 - 10.2 mg/dL    Total Protein 6.8 6.4 - 8.3 g/dL    Albumin 3.6 3.5 - 5.2 g/dL    Total Bilirubin 0.2 0.0 - 1.2 mg/dL    Alkaline Phosphatase 72 40 - 129 U/L     (H) 0 - 40 U/L     (H) 0 - 39 U/L   CBC   Result Value Ref Range    WBC 28.2 (H) 4.5 - 11.5 E9/L    RBC 5.15 3.80 - 5.80 E12/L    Hemoglobin 13.5 12.5 - 16.5 g/dL    Hematocrit 44.7 37.0 - 54.0 %    MCV 86.8 80.0 - 99.9 fL    MCH 26.2 26.0 - 35.0 pg    MCHC 30.2 (L) 32.0 - 34.5 %    RDW 14.7 11.5 - 15.0 fL    Platelets 922 368 - 350 E9/L    MPV 9.6 7.0 - 12.0 fL   Protime-INR   Result Value Ref Range    Protime 15.7 (H) 9.3 - 12.4 sec    INR 1.4    APTT   Result Value Ref Range    aPTT 31.0 24.5 - 35.1 sec   Lactic Acid   Result Value Ref Range    Lactic Acid 2.5 (H) 0.5 - 2.2 mmol/L   TEG lab test   Result Value Ref Range    R (Reaction Time) 4.6 (L) 5.0 - 10.0 min    K (Clotting Time) 1.9 1.0 - 3.0 min    Angle (Clot Strength) 64.1 59.0 - 74.0 degree    MA (Max Amplitude) 63.8 50.0 - 70.0 mm    G-TEG 8.8 4.5 - 11.0 K d/sc    EPL-TEG 0.0 0.0 - 15.0 %    LY30 (Fibrinolysis) 0.0 0.0 - 8.0 %   Serum Drug Screen   Result Value Ref Range    Ethanol Lvl <10 mg/dL    Acetaminophen Level 5.1 (L) 10.0 - 82.0 mcg/mL    Salicylate, Serum <7.5 0.0 - 30.0 mg/dL    TCA Scrn NEGATIVE Cutoff:300 ng/mL   Urinalysis   Result Value Ref Range    Color, UA Yellow Straw/Yellow    Clarity, UA Clear Clear    Glucose, Ur 250 (A) Negative mg/dL Bilirubin Urine Negative Negative    Ketones, Urine TRACE (A) Negative mg/dL    Specific Gravity, UA >=1.030 1.005 - 1.030    Blood, Urine LARGE (A) Negative    pH, UA 5.0 5.0 - 9.0    Protein, UA >=300 (A) Negative mg/dL    Urobilinogen, Urine 0.2 <2.0 E.U./dL    Nitrite, Urine Negative Negative    Leukocyte Esterase, Urine TRACE (A) Negative   URINE DRUG SCREEN   Result Value Ref Range    Amphetamine Screen, Urine NOT DETECTED Negative <1000 ng/mL    Barbiturate Screen, Ur NOT DETECTED Negative < 200 ng/mL    Benzodiazepine Screen, Urine POSITIVE (A) Negative < 200 ng/mL    Cannabinoid Scrn, Ur NOT DETECTED Negative < 50ng/mL    Cocaine Metabolite Screen, Urine NOT DETECTED Negative < 300 ng/mL    Opiate Scrn, Ur POSITIVE (A) Negative < 300ng/mL    PCP Screen, Urine NOT DETECTED Negative < 25 ng/mL    Methadone Screen, Urine NOT DETECTED Negative <300 ng/mL    Oxycodone Urine NOT DETECTED Negative <100 ng/mL    FENTANYL SCREEN, URINE POSITIVE (A) Negative <1 ng/mL    Drug Screen Comment: see below    Blood Gas, Arterial   Result Value Ref Range    Date Analyzed 20220602     Time Analyzed 1550     Source: Blood Arterial     pH, Blood Gas 7.055 (LL) 7.350 - 7.450    PCO2 64.3 (H) 35.0 - 45.0 mmHg    PO2 195.1 (H) 75.0 - 100.0 mmHg    HCO3 17.6 (L) 22.0 - 26.0 mmol/L    B.E. -13.5 (L) -3.0 - 3.0 mmol/L    O2 Sat 98.9 (H) 92.0 - 98.5 %    PO2/FIO2 1.95 mmHg/%    O2Hb 97.8 (H) 94.0 - 97.0 %    COHb 0.3 0.0 - 1.5 %    MetHb 0.8 0.0 - 1.5 %    O2 Content 20.1 mL/dL    HHb 1.1 0.0 - 5.0 %    tHb (est) 14.3 11.5 - 16.5 g/dL    Potassium 4.25 3.50 - 5.00 mmol/L    Mode AC     FIO2 100.0 %    Rr Mechanical 20.0 b/min    Vt Mechanical 500.0 mL    Peep/Cpap 5.0 cmH2O    Date Of Collection      Time Collected      Pt Temp 37.0 C     ID [id number]     Lab [id number]     Critical(s) Notified Handed report to Dr/RN    Magnesium   Result Value Ref Range    Magnesium 2.5 1.6 - 2.6 mg/dL   Phosphorus   Result Value Ref Range Phosphorus 6.7 (H) 2.5 - 4.5 mg/dL   Calcium, Ionized   Result Value Ref Range    Calcium, Ion 1.09 (L) 1.15 - 1.33 mmol/L   Lactic Acid   Result Value Ref Range    Lactic Acid 3.7 (HH) 0.5 - 2.2 mmol/L   Sodium Lab   Result Value Ref Range    Sodium 144 132 - 146 mmol/L   Sodium Lab   Result Value Ref Range    Sodium 146 132 - 146 mmol/L   CBC auto differential   Result Value Ref Range    WBC 13.4 (H) 4.5 - 11.5 E9/L    RBC 5.15 3.80 - 5.80 E12/L    Hemoglobin 13.3 12.5 - 16.5 g/dL    Hematocrit 45.0 37.0 - 54.0 %    MCV 87.4 80.0 - 99.9 fL    MCH 25.8 (L) 26.0 - 35.0 pg    MCHC 29.6 (L) 32.0 - 34.5 %    RDW 14.9 11.5 - 15.0 fL    Platelets 28 (L) 993 - 450 E9/L    MPV 11.8 7.0 - 12.0 fL    Neutrophils % 87.9 (H) 43.0 - 80.0 %    Lymphocytes % 6.9 (L) 20.0 - 42.0 %    Monocytes % 4.3 2.0 - 12.0 %    Eosinophils % 0.1 0.0 - 6.0 %    Basophils % 0.5 0.0 - 2.0 %    Neutrophils Absolute 11.93 (H) 1.80 - 7.30 E9/L    Lymphocytes Absolute 0.94 (L) 1.50 - 4.00 E9/L    Monocytes Absolute 0.54 0.10 - 0.95 E9/L    Eosinophils Absolute 0.00 (L) 0.05 - 0.50 E9/L    Basophils Absolute 0.00 0.00 - 0.20 E9/L    Metamyelocytes Relative 0.9 0.0 - 1.0 %    nRBC 3.4 /100 WBC    Anisocytosis 1+     Polychromasia 1+     Poikilocytes 3+     Vishnu Cells 3+    Blood gas, arterial   Result Value Ref Range    Date Analyzed 20220602     Time Analyzed 2050     Source: Blood Arterial     pH, Blood Gas 7.076 (LL) 7.350 - 7.450    PCO2 50.3 (H) 35.0 - 45.0 mmHg    PO2 105.7 (H) 75.0 - 100.0 mmHg    HCO3 14.4 (L) 22.0 - 26.0 mmol/L    B.E. -15.6 (L) -3.0 - 3.0 mmol/L    O2 Sat 96.8 92.0 - 98.5 %    PO2/FIO2 1.51 mmHg/%    AaDO2 321.8 mmHg    RI(T) 3.04     O2Hb 95.8 94.0 - 97.0 %    COHb 0.5 0.0 - 1.5 %    MetHb 0.5 0.0 - 1.5 %    HHb 3.2 0.0 - 5.0 %    tHb (est) 14.8 11.5 - 16.5 g/dL    Mode AC     FIO2 70.0 %    Rr Mechanical 24.0 b/min    Vt Mechanical 500.0 mL    Peep/Cpap 8.0 cmH2O    Date Of Collection      Time Collected      Pt Temp 37.0 C  ID 0421     Lab [id number]     Critical(s) Notified Handed report to Dr/RN    Comprehensive Metabolic Panel w/ Reflex to MG   Result Value Ref Range    Sodium 153 (H) 132 - 146 mmol/L    Potassium reflex Magnesium 3.7 3.5 - 5.0 mmol/L    Chloride 116 (H) 98 - 107 mmol/L    CO2 15 (L) 22 - 29 mmol/L    Anion Gap 22 (H) 7 - 16 mmol/L    Glucose 239 (H) 74 - 99 mg/dL    BUN 69 (H) 6 - 23 mg/dL    CREATININE 7.2 (H) 0.7 - 1.2 mg/dL    GFR Non-African American 8 >=60 mL/min/1.73    GFR African American 9     Calcium 7.2 (L) 8.6 - 10.2 mg/dL    Total Protein 5.8 (L) 6.4 - 8.3 g/dL    Albumin 2.9 (L) 3.5 - 5.2 g/dL    Total Bilirubin 1.2 0.0 - 1.2 mg/dL    Alkaline Phosphatase 72 40 - 129 U/L    ALT 1,308 (H) 0 - 40 U/L    AST 2,531 (H) 0 - 39 U/L   Vancomycin Level, Random   Result Value Ref Range    Vancomycin Rm <4.0 (L) 5.0 - 40.0 mcg/mL   Hemoglobin A1c   Result Value Ref Range    Hemoglobin A1C 9.1 (H) 4.0 - 5.6 %   Microscopic Urinalysis   Result Value Ref Range    WBC, UA 1-3 0 - 5 /HPF    RBC, UA 2-5 0 - 2 /HPF    Bacteria, UA FEW (A) None Seen /HPF    Amorphous, UA MODERATE    Blood gas, arterial   Result Value Ref Range    Date Analyzed 20220603     Time Analyzed 0009     Source: Blood Arterial     pH, Blood Gas 7.093 (LL) 7.350 - 7.450    PCO2 48.0 (H) 35.0 - 45.0 mmHg    PO2 115.0 (H) 75.0 - 100.0 mmHg    HCO3 14.3 (L) 22.0 - 26.0 mmol/L    B.E. -15.3 (L) -3.0 - 3.0 mmol/L    O2 Sat 97.7 92.0 - 98.5 %    PO2/FIO2 1.64 mmHg/%    AaDO2 315.0 mmHg    RI(T) 2.74     O2Hb 96.5 94.0 - 97.0 %    COHb 0.7 0.0 - 1.5 %    MetHb 0.5 0.0 - 1.5 %    HHb 2.3 0.0 - 5.0 %    tHb (est) 14.8 11.5 - 16.5 g/dL    Mode AC     FIO2 70.0 %    Rr Mechanical 24.0 b/min    Vt Mechanical 500.0 mL    Peep/Cpap 8.0 cmH2O    Date Of Collection      Time Collected      Pt Temp 37.0 C     ID 1721     Lab [id number]     Critical(s) Notified Handed report to Dr/RN    Blood gas, arterial   Result Value Ref Range    Date Analyzed [id number]     Time Analyzed 0531     Source: Blood Arterial     pH, Blood Gas 7.133 (LL) 7.350 - 7.450    PCO2 44.1 35.0 - 45.0 mmHg    PO2 97.8 75.0 - 100.0 mmHg    HCO3 14.4 (L) 22.0 - 26.0 mmol/L    B.E. -14.3 (L) -3.0 - 3.0 mmol/L    O2 Sat 96.5 92.0 - 98.5 %    PO2/FIO2 1.40 mmHg/%    AaDO2 336.4 mmHg    RI(T) 3.44     O2Hb 95.8 94.0 - 97.0 %    COHb 0.2 0.0 - 1.5 %    MetHb 0.5 0.0 - 1.5 %    HHb 3.5 0.0 - 5.0 %    tHb (est) 14.2 11.5 - 16.5 g/dL    Mode AC     FIO2 70.0 %    Rr Mechanical 24.0 b/min    Vt Mechanical 500.0 mL    Peep/Cpap 8.0 cmH2O    Date Of Collection      Time Collected      Pt Temp 37.0 C     ID 1721     Lab [id number]     Critical(s) Notified Handed report to Dr/RN    Lactic Acid   Result Value Ref Range    Lactic Acid 4.8 (HH) 0.5 - 2.2 mmol/L   Blood gas, arterial   Result Value Ref Range    Date Analyzed 20220603     Time Analyzed 0955     Source: Blood Arterial     pH, Blood Gas 7.163 (LL) 7.350 - 7.450    PCO2 40.0 35.0 - 45.0 mmHg    PO2 128.5 (H) 75.0 - 100.0 mmHg    HCO3 14.0 (L) 22.0 - 26.0 mmol/L    B.E. -13.9 (L) -3.0 - 3.0 mmol/L    O2 Sat 98.0 92.0 - 98.5 %    PO2/FIO2 1.84 mmHg/%    AaDO2 310.1 mmHg    RI(T) 2.41     O2Hb 97.1 (H) 94.0 - 97.0 %    COHb 0.5 0.0 - 1.5 %    MetHb 0.4 0.0 - 1.5 %    HHb 2.0 0.0 - 5.0 %    tHb (est) 13.4 11.5 - 16.5 g/dL    Mode AC     FIO2 70.0 %    Rr Mechanical 24.0 b/min    Vt Mechanical 500.0 mL    Peep/Cpap 8.0 cmH2O    Date Of Collection      Time Collected      Pt Temp 37.0 C     ID 2021     Lab [id number]     Critical(s) Notified Handed report to Dr/RN    Calcium, Ionized   Result Value Ref Range    Calcium, Ion 1.04 (L) 1.15 - 1.33 mmol/L   Phosphorus   Result Value Ref Range    Phosphorus 7.6 (H) 2.5 - 4.5 mg/dL   Magnesium   Result Value Ref Range    Magnesium 2.4 1.6 - 2.6 mg/dL   Platelet Confirmation   Result Value Ref Range    Platelet Confirmation CONFIRMED    Sodium, urine, random   Result Value Ref Range    Sodium, Ur 57 Not Established mmol/L   Creatinine, urine, random   Result Value Ref Range    Creatinine, Ur 274 40 - 278 mg/dL   POCT Glucose   Result Value Ref Range    Meter Glucose 290 (H) 74 - 99 mg/dL   POCT Glucose   Result Value Ref Range    Meter Glucose 237 (H) 74 - 99 mg/dL   POCT Glucose   Result Value Ref Range    Meter Glucose 187 (H) 74 - 99 mg/dL   TYPE AND SCREEN   Result Value Ref Range    ABO/Rh A POS     Antibody Screen NEG        RADIOLOGY:  Interpreted by Radiologist.  XR CHEST PORTABLE   Final Result   1. Stable bibasilar parenchymal densities concerning for pneumonia and or   atelectasis   2. Slightly high position of the nasogastric tube. Consider advancement. XR CHEST PORTABLE   Final Result   1. Status post placement of left subclavian central venous catheter with   distal tip at location of SVC. 2. Endotracheal tube is approximately 5 cm above the roberto. 3. Stable bibasilar opacities related to pneumonia or atelectasis with   probable bilateral pleural effusions. CT HEAD WO CONTRAST   Final Result   Diffuse edema, subarachnoid hemorrhage and extra-axial acute hematoma in the   left hemisphere with midline shift to the right by approximately 5 mm. There   appears to be blood in the 4th ventricle and possibly along the tentorium and   falx. Neuro surgical consultation recommended. Left frontal frontal scalp hematoma. Critical results were called by Dr. Luciano Bond to Dr. Melquiades Raman On 6/2/2022 at 4:35   p.m.         CT CERVICAL SPINE WO CONTRAST   Final Result   1. No fracture or subluxation. 2.  Osteo-degenerative changes and discogenic disc disease, as described   above. 3.  Small posterior ridge-disc complexes from C3-4 down to C6-7.      4.  Mild spinal canal stenosis from C3-4 down to C6-7.      5.  Other findings, as described above. CT CHEST WO CONTRAST   Final Result   1. Small, complex bilateral pleural effusions, as described above.   Rule out pulmonary aspiration disease. 2.  Centrilobular emphysema. 3.  Mildly enlarged left axillary lymph node. Clinical correlation is   recommended. 4.  Mild dilatation of the main pulmonary trunk. Rule out pulmonary arterial   hypertension. 5.  Mild left-sided gynecomastia. 6.  Other findings, as described above. CT ABDOMEN PELVIS WO CONTRAST Additional Contrast? None   Final Result   1. Opacities are present in visualized lung bases related to subsegmental   atelectasis or bilateral pneumonia. 2. Nonspecific bilateral perinephric fat stranding possibly related to   chronic medical renal disease with appropriate clinical history. 3. Diverticulosis. XR PELVIS (1-2 VIEWS)   Final Result   No acute process         XR CHEST PORTABLE   Final Result   No acute process         XR CHEST PORTABLE    (Results Pending)       EKG: This EKG is signed and interpreted by the EP. Time:   Rate:   Rhythm:   Interpretation:   Comparison:       ------------------------- NURSING NOTES AND VITALS REVIEWED ---------------------------   The nursing notes within the ED encounter and vital signs as below have been reviewed by myself. /61   Pulse 84   Temp 97.7 °F (36.5 °C)   Resp 24   Ht 6' 2\" (1.88 m)   Wt 230 lb (104.3 kg)   SpO2 97%   BMI 29.53 kg/m²   Oxygen Saturation Interpretation: Normal    The patients available past medical records and past encounters were reviewed.         ------------------------------ ED COURSE/MEDICAL DECISION MAKING----------------------  Medications   sodium chloride 3 % solution ( IntraVENous Held by provider 6/3/22 1767)   vancomycin (VANCOCIN) 1,750 mg in dextrose 5 % 500 mL IVPB (1,750 mg IntraVENous Not Given 6/2/22 1518)   sodium chloride flush 0.9 % injection 10 mL (10 mLs IntraVENous Not Given 6/3/22 5826)   sodium chloride flush 0.9 % injection 10 mL (has no administration in time range)   0.9 % sodium chloride infusion (has no administration in time range)   ondansetron (ZOFRAN-ODT) disintegrating tablet 4 mg (has no administration in time range)     Or   ondansetron (ZOFRAN) injection 4 mg (has no administration in time range)   polyethylene glycol (GLYCOLAX) packet 17 g (17 g Oral Not Given 6/3/22 0808)   docusate sodium (COLACE) 150 MG/15ML liquid 100 mg (100 mg Oral Not Given 6/3/22 0807)   sodium bicarbonate 8.4 % injection 50 mEq (50 mEq IntraVENous Not Given 6/2/22 1756)   glucose chewable tablet 16 g (has no administration in time range)   dextrose bolus 10% 125 mL (has no administration in time range)     Or   dextrose bolus 10% 250 mL (has no administration in time range)   glucagon (rDNA) injection 1 mg (has no administration in time range)   dextrose 5 % solution (has no administration in time range)   insulin lispro (HUMALOG) injection vial 0-18 Units (6 Units SubCUTAneous Given 6/3/22 0840)   cefepime (MAXIPIME) 1000 mg IVPB minibag (0 mg IntraVENous Stopped 6/3/22 0458)   chlorhexidine (PERIDEX) 0.12 % solution 15 mL (15 mLs Mouth/Throat Given 6/2/22 2100)   acetaminophen (TYLENOL) 160 MG/5ML solution 650 mg (650 mg Oral Given 6/2/22 2109)   vasopressin (VASOSTRICT) 20 UNIT/ML injection (  Canceled Entry 6/2/22 1756)   EPINEPHrine (EPINEPHrine HCL) 5 mg in sodium chloride 0.9 % 250 mL infusion (15 mcg/min IntraVENous New Bag 6/3/22 0702)   norepinephrine (LEVOPHED) 16 mg in sodium chloride 0.9 % 250 mL infusion (50 mcg/min IntraVENous New Bag 6/3/22 0950)   vasopressin 20 Units in sodium chloride 0.9 % 100 mL infusion (0.04 Units/min IntraVENous Rate/Dose Verify 6/3/22 0550)   famotidine (PEPCID) suspension 20 mg (20 mg Oral Given 6/2/22 2109)   sodium bicarbonate 75 mEq in sodium chloride 0.45 % 1,000 mL infusion ( IntraVENous Rate/Dose Change 6/3/22 0627)   polyvinyl alcohol (LIQUIFILM TEARS) 1.4 % ophthalmic solution 1 drop (1 drop Both Eyes Given 6/3/22 0539)   lubrifresh P.M. (artificial tears) ophthalmic ointment ( Both Eyes Given 6/3/22 0539)   calcium gluconate 4,000 mg in dextrose 5 % 100 mL IVPB (4,000 mg IntraVENous New Bag 6/3/22 0747)   0.9 % sodium chloride infusion ( IntraVENous New Bag 6/3/22 0721)   norepinephrine (LEVOPHED) 16 mg in dextrose 5% 250 mL infusion (100 mcg/min IntraVENous New Bag 6/2/22 1455)   sodium chloride 3 % solution (250 mLs IntraVENous New Bag 6/2/22 1502)   EPINEPHrine (EPINEPHrine HCL) 5 mg in dextrose 5 % 250 mL infusion (25 mcg/min  Rate/Dose Change 6/2/22 1615)   dextrose 5 % solution (  Return to [facility] 6/3/22 0500)   0.9 % sodium chloride bolus (0 mLs IntraVENous Stopped 6/2/22 1955)   calcium gluconate 2,000 mg in dextrose 5 % 100 mL IVPB ( IntraVENous Stopped 6/2/22 2211)   sodium bicarbonate 8.4 % injection 50 mEq (50 mEq IntraVENous Given 6/2/22 2111)   sodium bicarbonate 8.4 % injection 100 mEq (100 mEq IntraVENous Given 6/3/22 0019)   sodium bicarbonate 8.4 % injection 50 mEq (50 mEq IntraVENous Given 6/3/22 0626)   0.9 % sodium chloride bolus (0 mLs IntraVENous Stopped 6/3/22 0823)         ED COURSE:       Medical Decision Making:    Trauma team on arrival due to ohs report, admit to trauma        Consultations:             trauma    Critical Care:         C     --------------------------------- IMPRESSION AND DISPOSITION ---------------------------------    IMPRESSION  1. Acute cerebrovascular accident [facility])        DISPOSITION  Disposition: Admit to CCU/ICU  Patient condition is serious    NOTE: This report was transcribed using voice recognition software.  Every effort was made to ensure accuracy; however, inadvertent computerized transcription errors may be present        Joslein Tay MD  06/03/22 5724

## 2022-06-03 NOTE — PLAN OF CARE
Problem: Discharge Planning  Goal: Discharge to home or other facility with appropriate resources  6/2/2022 1748 by Boone Arroyo RN  Outcome: Not Progressing  Flowsheets (Taken 6/2/2022 1715 by Dorothy Bolanos, JOSE G)  Discharge to home or other facility with appropriate resources:   Identify barriers to discharge with patient and caregiver   Refer to discharge planning if patient needs post-hospital services based on physician order or complex needs related to functional status, cognitive ability or social support system   Identify discharge learning needs (meds, wound care, etc)     Problem: Pain  Goal: Verbalizes/displays adequate comfort level or baseline comfort level  6/2/2022 1748 by Boone Arroyo RN  Outcome: Not Progressing     Problem: Neurosensory - Adult  Goal: Achieves stable or improved neurological status  6/3/2022 0107 by Jeremías Paulson RN  Outcome: Not Progressing  6/2/2022 1748 by Boone Arroyo RN  Outcome: Not Progressing     Problem: Respiratory - Adult  Goal: Achieves optimal ventilation and oxygenation  6/3/2022 0107 by Jeremías Paulson RN  Outcome: Not Progressing  6/2/2022 1748 by Boone Arroyo RN  Outcome: Not Progressing     Problem: Cardiovascular - Adult  Goal: Maintains optimal cardiac output and hemodynamic stability  6/3/2022 0107 by Jeremías Paulson RN  Outcome: Not Progressing  6/2/2022 1748 by Boone Arroyo RN  Outcome: Not Progressing     Problem: Infection - Adult  Goal: Absence of infection at discharge  6/2/2022 1748 by Boone Arroyo RN  Outcome: Not Progressing  Flowsheets (Taken 6/2/2022 1715 by Dorothy Bolanos RN)  Absence of infection at discharge:   Assess and monitor for signs and symptoms of infection   Identify and instruct in appropriate isolation precautions for identified infection/condition   Administer medications as ordered   Monitor endotracheal (as able) and nasal secretions for changes in amount and color   Monitor all insertion sites i.e., indwelling lines, tubes and drains  Goal: Absence of infection during hospitalization  6/2/2022 1748 by Eileen Webster RN  Outcome: Not Progressing     Problem: Metabolic/Fluid and Electrolytes - Adult  Goal: Electrolytes maintained within normal limits  6/2/2022 1748 by Eileen Webster RN  Outcome: Not Progressing  Flowsheets (Taken 6/2/2022 1715 by Clau Melo RN)  Electrolytes maintained within normal limits: Monitor labs and assess patient for signs and symptoms of electrolyte imbalances  Goal: Hemodynamic stability and optimal renal function maintained  6/2/2022 1748 by Eileen Webster RN  Outcome: Not Progressing  Flowsheets (Taken 6/2/2022 1715 by Clau Melo RN)  Hemodynamic stability and optimal renal function maintained: Monitor labs and assess for signs and symptoms of volume excess or deficit     Problem: Hematologic - Adult  Goal: Maintains hematologic stability  6/2/2022 1748 by Eileen Webster RN  Outcome: Not Progressing  Flowsheets (Taken 6/2/2022 1715 by Clau Melo RN)  Maintains hematologic stability: Assess for signs and symptoms of bleeding or hemorrhage

## 2022-06-03 NOTE — PROGRESS NOTES
[facility] called. Reference number is [id number]. They stated they are not going to follow.

## 2022-06-03 NOTE — PROGRESS NOTES
Pharmacy Consultation Note  (Antibiotic Dosing and Monitoring)    Initial consult date: 6/2/22  Consulting physician/provider: Mayito Panda MD  Drug: Vancomycin  Indication: Aspiration Pneumonia     Vancomycin has been discontinued. Clinical pharmacy will sign off, please reconsult if further assistance is needed.        Shahida Florian, [facility] 6/3/2022 10:52 AM

## 2022-06-03 NOTE — PROGRESS NOTES
[facility]  SURGICAL INTENSIVE CARE UNIT (SICU)  ATTENDING PHYSICIAN CRITICAL CARE PROGRESS NOTE     I have examined the patient, reviewed the record, and discussed the case with the APN/ resident. Please refer to the APN/ resident's note. I agree with the assessment and plan. I have reviewed all relevant labs and imaging data. The following summarizes my clinical findings and independent assessment. CC:  Critical care management after stroke    Hospital Course/Overnight Events:  6/2--presented from [facility]SH after found to have large ischemic stroke with significant shift and small amount of SAH  6/3--remains on multiple pressors    Intubated  No eye opening  No response to pain  No cough  No gag  No spontaneous respirations  Hrt:  Regular rate/rhythm; no murmur  Lungs:  Fairly clear bilaterally  Abd:  Soft; hypoactive BS   Skin:  Warm/dry  Ext:  No edema    Labs personally reviewed  Films personally reviewed/interpreted--fairly clear CXR    Patient Active Problem List    Diagnosis Date Noted    Acute cerebrovascular accident ([facility] [address] 06/02/2022    Trauma 06/02/2022    Intracranial hemorrhage ([facility] [address] 06/02/2022    Acute respiratory failure with hypercapnia (HCC)     Lactic acidosis     Septic shock (HCC)      Acute ischemic stroke  SAH  Acute respiratory failure  Hypotension--on levo/epi/vaso drips  Shock liver/elevated LFTs  Acute renal failure  Lactic acidosis  hyperglycemia  Hypoalbuminemia  Electrolyte imbalance (hypernatremia/hypocalcemia/hyperphosphatemia)  Thrombocytopenia    Discussed with significant other poor prognosis--discussed brain death testing; she states she would like to withdraw support. She asked about organ donation. Explained that pt could potentially donate bone/tissue/corneas but organs have been medically ruled out. Will stop all pressors and compassionately extubate pt.     Gino Croft MD, FACS  6/3/2022  11:34 AM      Critical care time exclusive of teaching and procedures = 36 minutes

## 2022-06-03 NOTE — PROGRESS NOTES
Surgical Intensive Care Unit   Daily Progress Note     Date of admission: 6/2/2022    Reason for ICU: CVA, [address], sepsi    Pertinent Hospital Course Events:   6/2-presented to ED as trauma team; transferred from [facility]; found to have L-sided CVA, SAH. Presented to ED on levophed and epi, required vasopressin shortly after admission. Initially febrile at 104F, started on broad-spectrum Abx. Overnight Events:   6/3- acidemic overnight w/ pH 7.076, pCO2 50.3, HCO3 14.4. Received 3 amps bicarb and started on bicarb gtt @ 75 cc/hr. Subjective: intubated, GCS 3T, not on any sedation, pupils 4 mm, fixed. Levophed titrated down to 55 mcg/min. Physical Exam:   /61   Pulse 78   Temp 96.9 °F (36.1 °C) (Bladder)   Resp 24   Ht 6' 2\" (1.88 m)   Wt 230 lb (104.3 kg)   SpO2 96%   BMI 29.53 kg/m²      CONSTITUTIONAL: Obtunded, , L-sided subclavian in place, PICC line LUE  NEURO: GCS 3T;   HEENT: pupils 4 mm, fixed; intubated, ecchymoses left forehead  LUNGS: CTAB  CARDIOVASCULAR: RRR  ABDOMEN: soft, nondistended  MUSCULOSKELETAL: ecchymoses left shoulder, left anterior chest, L forehead    ASSESSMENT / PLAN:   Neuro: L CVA, SAH, IVH with cerebral edema and 5 mm shift; GCS 3T, not on sedation; pupils fixed. Received 3% saline. Continue neuro checks; keppra 500 mg BID seizure ppx; neurosurgery following, no intervention planned  CV: Hypotension; sepsis? On levophed, epi, and vasopressin. Wean as able. Keep MAP >65. Pulm: Intubated; vent settings currently ACVC 24 500 70% 8+  GI: Shock liver- trend LFTs; NPO, pepcid GI ppx  Renal: creatinine 5.6 on admission, unknown baseline. Trending up to 7.2 this morning. Acidemic, bicarb 14.3, on bicarb gtt; lactic acidosis, lactate trending up, 4.8 today. Trend q6h; replace electrolytes as needed  ID: Sepsis, source unknown. Has been febrile, temp trending down. Cooling blanket and tylenol for fever.  Marked leukocytosis on admission (28.2), trending down to 13.4. Received 1 dose vanc and zosyn in the ED. Cefepime started. Blood and urine cultures pending, follow up. Endocrine: h/o diabetes, unknown home medication regimen. Start high dose SSI, trend glucose. Keep BG <180. MSK: no acute issues   Heme: H&H stable, trend w/ daily CBC. Transfuse for Hgb <7. Thrombocytopenic today, platelets 523 --> 28. Trend. Transfuse if <10. Bowel regimen: glycolax, colace  Pain control/Sedation: none  DVT prophylaxis: SCDs  GI: pepcid  Glucose protocol: high dose SSI  Mouth/Eye care: Artificial tears/peridex   Central Lines: L subclavian (6/2); LUE PICC (6/2)  Flores: Keep in place for critical care monitoring of fluid balance.    Patient/Family update: As available      Signed:  Reta Starks MD  6/3/2022  6:12 AM

## 2022-06-03 NOTE — PROGRESS NOTES
Called [facility] to talk with someone who took care of patient when he under their care. Talked with Guerline Noland RN. She found a number that she had for the patients girl friend, Suzette Ho. Her number is [phone]. The name and number is added to the chart.

## 2022-06-03 NOTE — CONSULTS
[address]                  [address] [place],  [address]                                  CONSULTATION    PATIENT NAME: Tony Pollock                       :        1960  MED REC NO:   [id number]                            ROOM:       3809  ACCOUNT NO:   [de-identified]                           ADMIT DATE: 2022  PROVIDER:     Susan Johnson MD    CONSULT DATE:  2022    REASON FOR CONSULTATION:  Left hemispheric stroke with hemorrhagic  conversion of stroke with subarachnoid hemorrhage. HISTORY OF PRESENT ILLNESS:  The patient is a 27-year-old gentleman who  was found covered in dirt in his bed, last known normal was yesterday. He was taken to an outside hospital where he was intubated for airway  protection. He subsequently had a CT scan that showed large left  hemispheric stroke with subarachnoid hemorrhage. He was ultimately  transferred to [facility] for further  evaluation and management. It was thought that he was a trauma. Upon  arrival, he was intubated and the rest of the interview and examination  were difficult to complete. PAST MEDICAL HISTORY:  Unknown. PAST SURGICAL HISTORY:  Unknown. FAMILY HISTORY:  Unknown. ALLERGIES:  Unknown. HOME MEDICATIONS:  Unknown. REVIEW OF SYSTEMS:  I was unable to complete a 14-point review of  systems because of the patient's current medical condition. PHYSICAL EXAMINATION:  VITAL SIGNS:  He is febrile with a T-current of 39.4 degree Celsius,  pulse is 87, blood pressure is 103/61, respiratory rate is 24. GENERAL:  He is in bed. He is in respiratory distress, currently on  ventilatory support. He appears older than his stated age. HEENT:  His head is normocephalic and atraumatic. Pupils are 4 mm  bilaterally and nonreactive. He has no drainage out of his eyes, ears,  nose, or throat. SKIN:  His skin is warm and dry.   MUSCULOSKELETAL:  He

## 2022-06-03 NOTE — FLOWSHEET NOTE
06/03/22 1159   Encounter Summary   Encounter Overview/Reason  Initial Encounter   Service Provided For: Family   Referral/Consult From: Multi-disciplinary team;Nurse   Support System Family members   Last Encounter  06/03/22  (DL)   Complexity of Encounter High   Grief, Loss, and Adjustments   Type End of Life   Assessment/Intervention/Outcome   Assessment Coping;Tearful   Intervention Active listening;Discussed meaning/purpose;Prayer (assurance of)/[place];Nurtured Hope;Read/Provided Scripture; Discussed belief system/Judaism practices/shana;Discussed relationship with God   Outcome Comfort;Engaged in conversation;Expressed feelings of Naomi, Peace and/or Love;Expressed Gratitude;Expressed feelings, needs, and concerns

## 2022-06-03 NOTE — PROGRESS NOTES
Department of Neurosurgery  Progress Note    CHIEF COMPLAINT: seen for stroke with hemorrhagic conversion     SUBJECTIVE:  Pupils fixed and no movement to noxious stimuli. Remains intubated.     REVIEW OF SYSTEMS :  Unable to obtain     OBJECTIVE:   VITALS:  /61   Pulse 84   Temp 97.7 °F (36.5 °C)   Resp 24   Ht 6' 2\" (1.88 m)   Wt 230 lb (104.3 kg)   SpO2 97%   BMI 29.53 kg/m²     PHYSICAL:  Intubated  Pupils 4mm, nonreactive and fixed  No movement to noxious stimuli   No gag reflex  Sin is cold to touch  Abdomen is soft    DATA:  CBC:   Lab Results   Component Value Date    WBC 13.4 06/03/2022    RBC 5.15 06/03/2022    HGB 13.3 06/03/2022    HCT 45.0 06/03/2022    MCV 87.4 06/03/2022    MCH 25.8 06/03/2022    MCHC 29.6 06/03/2022    RDW 14.9 06/03/2022    PLT 28 06/03/2022    MPV 11.8 06/03/2022     BMP:    Lab Results   Component Value Date     06/03/2022    K 3.7 06/03/2022     06/03/2022    CO2 15 06/03/2022    BUN 69 06/03/2022    LABALBU 2.9 06/03/2022    CREATININE 7.2 06/03/2022    CALCIUM 7.2 06/03/2022    GFRAA 9 06/03/2022    LABGLOM 8 06/03/2022    GLUCOSE 239 06/03/2022     PT/INR:    Lab Results   Component Value Date    PROTIME 15.7 06/02/2022    INR 1.4 06/02/2022     PTT:    Lab Results   Component Value Date    APTT 31.0 06/02/2022   [APTT}    Current Inpatient Medications  Current Facility-Administered Medications: polyvinyl alcohol (LIQUIFILM TEARS) 1.4 % ophthalmic solution 1 drop, 1 drop, Both Eyes, Q4H  lubrifresh P.M. (artificial tears) ophthalmic ointment, , Both Eyes, Q4H  calcium gluconate 4,000 mg in dextrose 5 % 100 mL IVPB, 4,000 mg, IntraVENous, Once  0.9 % sodium chloride infusion, , IntraVENous, Continuous  [Held by provider] sodium chloride 3 % solution, 35 mL/hr, IntraVENous, Continuous  vancomycin (VANCOCIN) 1,750 mg in dextrose 5 % 500 mL IVPB, 20 mg/kg, IntraVENous, Once  sodium chloride flush 0.9 % injection 10 mL, 10 mL, IntraVENous, 2 times per day  sodium chloride flush 0.9 % injection 10 mL, 10 mL, IntraVENous, PRN  0.9 % sodium chloride infusion, , IntraVENous, PRN  ondansetron (ZOFRAN-ODT) disintegrating tablet 4 mg, 4 mg, Oral, Q8H PRN **OR** ondansetron (ZOFRAN) injection 4 mg, 4 mg, IntraVENous, Q6H PRN  polyethylene glycol (GLYCOLAX) packet 17 g, 17 g, Oral, Daily  docusate sodium (COLACE) 150 MG/15ML liquid 100 mg, 100 mg, Oral, Daily  sodium bicarbonate 8.4 % injection 50 mEq, 50 mEq, IntraVENous, Once  glucose chewable tablet 16 g, 4 tablet, Oral, PRN  dextrose bolus 10% 125 mL, 125 mL, IntraVENous, PRN **OR** dextrose bolus 10% 250 mL, 250 mL, IntraVENous, PRN  glucagon (rDNA) injection 1 mg, 1 mg, IntraMUSCular, PRN  dextrose 5 % solution, 100 mL/hr, IntraVENous, PRN  insulin lispro (HUMALOG) injection vial 0-18 Units, 0-18 Units, SubCUTAneous, Q4H  cefepime (MAXIPIME) 1000 mg IVPB minibag, 1,000 mg, IntraVENous, Q12H  chlorhexidine (PERIDEX) 0.12 % solution 15 mL, 15 mL, Mouth/Throat, BID  acetaminophen (TYLENOL) 160 MG/5ML solution 650 mg, 650 mg, Oral, Q4H PRN  EPINEPHrine (EPINEPHrine HCL) 5 mg in sodium chloride 0.9 % 250 mL infusion, 1-25 mcg/min, IntraVENous, Continuous  norepinephrine (LEVOPHED) 16 mg in sodium chloride 0.9 % 250 mL infusion, 1-100 mcg/min, IntraVENous, Continuous  vasopressin 20 Units in sodium chloride 0.9 % 100 mL infusion, 0.04 Units/min, IntraVENous, Continuous  famotidine (PEPCID) suspension 20 mg, 20 mg, Oral, BID  sodium bicarbonate 75 mEq in sodium chloride 0.45 % 1,000 mL infusion, , IntraVENous, Continuous    ASSESSMENT:   Left hemispheric stroke with hemorrhagic conversion    PLAN:  -Poor prognosis. No neurosurgical intervention      Electronically signed by Tarun Neal PA-C on 6/3/2022 at 10:43 AM      I have examined the patient and agree with above. He has stroke with hemorrhagic conversion. No intervention. Poor prognosis.   Over 20 minutes was spent on medical decision making    Raenell Ropes,

## 2022-06-03 NOTE — DISCHARGE SUMMARY
Physician Discharge Summary     Patient ID:  [facility].  [id number]  60 y.o.  1960    Admit date: 2022    Discharge date and time: 6/3/2022  1:47 PM     Admitting Physician: Lucio Martinez MD     Admission Diagnoses: Trauma [T14.90XA]  Acute cerebrovascular accident [facility]) [I63.9]    Discharge Diagnoses: Principal Problem:    Acute cerebrovascular accident [facility])  Active Problems:    Trauma    Acute respiratory failure with hypercapnia (HCC)    Lactic acidosis    Septic shock ([facility] [address]    Intracranial hemorrhage ([facility] [address]  Resolved Problems:    * No resolved hospital problems. 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 Found in bed with grass stains and dirt covering him. Unresponsive. GCS3T. Found to have large L CVA stroke with SAH. NSG consulted and recs no intervention. Pupils fixed, no cough/gag, doll's eyes. On 3 pressors. Elevated creatinine with anuria. No sedation started. 6/3: Tert unchanged. Discussed with the patient's significant other who is the only POA as he is estranged from his kids and his wife is . She would like to withdraw all care. Pt terminally extubated. TOD 11:39 on 6/3/22. Consults:   IP CONSULT TO DIETITIAN    Significant Diagnostic Studies:   CT ABDOMEN PELVIS WO CONTRAST Additional Contrast? None    Result Date: 2022  EXAMINATION: CT OF THE ABDOMEN AND PELVIS WITHOUT CONTRAST 2022 3:24 pm TECHNIQUE: CT of the abdomen and pelvis was performed without the administration of intravenous contrast. Multiplanar reformatted images are provided for review. Automated exposure control, iterative reconstruction, and/or weight based adjustment of the mA/kV was utilized to reduce the radiation dose to as low as reasonably achievable. COMPARISON: None.  HISTORY: ORDERING SYSTEM PROVIDED HISTORY: Trauma, no Pelvic ring is intact. No acute process     CT HEAD WO CONTRAST    Result Date: 6/2/2022  EXAMINATION: CT OF THE HEAD WITHOUT CONTRAST  6/2/2022 3:24 pm TECHNIQUE: CT of the head was performed without the administration of intravenous contrast. Automated exposure control, iterative reconstruction, and/or weight based adjustment of the mA/kV was utilized to reduce the radiation dose to as low as reasonably achievable. COMPARISON: None. HISTORY: ORDERING SYSTEM PROVIDED HISTORY: TRAUMA TECHNOLOGIST PROVIDED HISTORY: Has a \"code stroke\" or \"stroke alert\" been called? ->No Reason for exam:->TRAUMA What reading provider will be dictating this exam?->CRC FINDINGS: BRAIN/VENTRICLES: There is combination left temporoparietal epidural and diffuse left-sided subarachnoid bleed. Diffuse low-attenuation changes in the left hemisphere compatible with edema. There is midline shift to the right by approximately 5 mm. ORBITS: The visualized portion of the orbits demonstrate no acute abnormality. SINUSES: There is mucosal thickening in bilateral maxillary, ethmoid, sphenoid sinuses. Air-fluid level in the right maxillary and sphenoid sinuses. SOFT TISSUES/SKULL:  No acute abnormality of the visualized skull. There is left frontal scalp hematoma. ETT in place. Diffuse edema, subarachnoid hemorrhage and extra-axial acute hematoma in the left hemisphere with midline shift to the right by approximately 5 mm. There appears to be blood in the 4th ventricle and possibly along the tentorium and falx. Neuro surgical consultation recommended. Left frontal frontal scalp hematoma. Critical results were called by Dr. Fabby Bertrand to Dr. Mathew Quispe On 6/2/2022 at 4:35 p.m.     CT CHEST WO CONTRAST    Result Date: 6/2/2022  EXAMINATION: CT OF THE CHEST WITHOUT CONTRAST 6/2/2022 3:24 pm TECHNIQUE: CT of the chest was performed without the administration of intravenous contrast. Multiplanar reformatted images are provided for review.  Automated exposure control, iterative reconstruction, and/or weight based adjustment of the mA/kV was utilized to reduce the radiation dose to as low as reasonably achievable. COMPARISON: Correlation is made with chest radiograph performed earlier in the day. HISTORY: ORDERING SYSTEM PROVIDED HISTORY: trauma TECHNOLOGIST PROVIDED HISTORY: Reason for exam:->trauma Decision Support Exception - unselect if not a suspected or confirmed emergency medical condition->Emergency Medical Condition (MA) What reading provider will be dictating this exam?->CRC FINDINGS: Mediastinum: Shotty mediastinal lymph nodes are identified. No hilar or right axillary lymphadenopathy is seen. A mildly enlarged left axillary lymph node is noted, measuring 2.0 x 1.8 cm in size. Clinical correlation is recommended. The thoracic aorta is mildly atherosclerotic. There is mild dilatation of the main pulmonary trunk to 3.4 cm. Rule out pulmonary arterial hypertension. The heart is within normal limits in size. Thyroid gland and trachea are unremarkable. An endotracheal tube is seen, with the tip 3.9 cm above the roberto. An NG tube is noted, with the tip in the stomach. The esophagus is slightly distended with low attenuation, probably representing fluid. Lungs/pleura: There are small, bilateral pleural effusions, however, just below the pleural effusions are dependent areas of higher density. On the surface of the pleural effusions are ill-defined hazy opacities. Mild peribronchial cuffing/thickening is also present. Given that the patient is intubated and has an NG tube, the presence of pulmonary aspiration disease needs to be excluded. Centrilobular emphysema is identified. No pulmonary parenchymal nodule or mass is appreciated. Upper Abdomen: An exophytic 1.2 x 1.0 cm upper pole left renal cortical cyst is identified. Soft Tissues/Bones: Mild left-sided gynecomastia is noted. An old right 10th rib fracture is identified.   An old left 11th rib fracture is seen. Osteo-degenerative changes are noted involving the visualized cervical and thoracic spine. 1.  Small, complex bilateral pleural effusions, as described above. Rule out pulmonary aspiration disease. 2.  Centrilobular emphysema. 3.  Mildly enlarged left axillary lymph node. Clinical correlation is recommended. 4.  Mild dilatation of the main pulmonary trunk. Rule out pulmonary arterial hypertension. 5.  Mild left-sided gynecomastia. 6.  Other findings, as described above. CT CERVICAL SPINE WO CONTRAST    Result Date: 6/2/2022  EXAMINATION: CT OF THE CERVICAL SPINE WITHOUT CONTRAST 6/2/2022 3:24 pm TECHNIQUE: CT of the cervical spine was performed without the administration of intravenous contrast. Multiplanar reformatted images are provided for review. Automated exposure control, iterative reconstruction, and/or weight based adjustment of the mA/kV was utilized to reduce the radiation dose to as low as reasonably achievable. COMPARISON: None. HISTORY: ORDERING SYSTEM PROVIDED HISTORY: TRAUMA TECHNOLOGIST PROVIDED HISTORY: Reason for exam:->TRAUMA What reading provider will be dictating this exam?->CRC FINDINGS: BONES/ALIGNMENT: There is preservation of the normal lordotic curve. There is no evidence of fracture or subluxation. DEGENERATIVE CHANGES: Osteo-degenerative changes are noted, with anterior, lateral, and posterior spondylitic ridging from C3 down to a C7. Posterior spondylitic ridging is also seen at C2-3. Osteo-degenerative changes are also noted involving the anterior tick elation of C1-2. Multilevel bilateral uncovertebral hypertrophy and sclerosis is identified. Mild left-sided neural foraminal narrowing is identified at C2-3. Moderate right-sided neural foraminal narrowing is identified at C3-4. Mild left-sided neural foraminal narrowing is seen at C3-4. Moderate bilateral neural foraminal narrowing is seen at C4-5.   Moderate to marked bilateral neural foraminal narrowing is identified at C5-6. significant left-sided neural foraminal narrowing is identified at C6-7. There is probable impression on the left exiting nerve root. Mild to moderate right-sided neural foraminal narrowing is seen at C6-7. Moderate right-sided neural foraminal narrowing is seen at C7-T1. Mild left-sided neural foraminal narrowing is identified at C7-T1. No other significant osseous abnormality is seen. Intervertebral disc space narrowing is identified from C3-4 down to a C6-7. A \"vacuum-disc\" phenomenon is identified at C3-4. A small posterior ridge-disc complex is seen at C3-4, causing impression on the ventral thecal sac and mild impression on the ventral cord. A small posterior ridge-disc complex is seen at C4-5, causing impression on the ventral thecal sac and mild impression on the ventral cord. A small posterior ridge-disc complex is seen at C5-6, causing impression on the ventral thecal sac and mild impression on the ventral cord. A small posterior ridge-disc complex is identified at C6-7, causing impression on the ventral thecal sac and mild impression on the ventral cord. The remainder of the visualized cervical and thoracic cord and thecal sac are unremarkable. Mild spinal canal stenosis is identified from C3-4 down to C6-7, secondary to the ridge-disc complexes and facet arthropathy. SOFT TISSUES: There is no prevertebral soft tissue swelling. A small amount of calcific plaque is noted within both carotid bulbs. Brain findings are discussed on the report from the patient's CT scan of the brain performed the same day. Please refer to that transcription. 1.  No fracture or subluxation. 2.  Osteo-degenerative changes and discogenic disc disease, as described above. 3.  Small posterior ridge-disc complexes from C3-4 down to C6-7. 4.  Mild spinal canal stenosis from C3-4 down to C6-7. 5.  Other findings, as described above.      XR CHEST PORTABLE    Result Date: 6/3/2022  EXAMINATION: ONE XRAY VIEW OF THE CHEST 6/3/2022 6:49 am COMPARISON: 06/02/2022 HISTORY: ORDERING SYSTEM PROVIDED HISTORY: intubated TECHNOLOGIST PROVIDED HISTORY: Reason for exam:->intubated What reading provider will be dictating this exam?->CRC FINDINGS: The tip of the endotracheal tube is above the roberto. The tip of the left central line projects over the superior vena cava. The tip of the nasogastric tube projects over the proximal fundus of the stomach but the side hole is within the distal esophagus. Consider advancing the catheter into the body 4-5 cm. There are bibasilar linear opacities suggest atelectasis but this appears stable. The mid and upper lung fields appear clear. The lungs are hyperlucent and this finding is nonspecific but has been associated with chronic obstructive pulmonary disease. The pulmonary vasculature and heart size are within the normal range. 1. Stable bibasilar parenchymal densities concerning for pneumonia and or atelectasis 2. Slightly high position of the nasogastric tube. Consider advancement. XR CHEST PORTABLE    Result Date: 6/2/2022  EXAMINATION: ONE XRAY VIEW OF THE CHEST 6/2/2022 5:58 pm COMPARISON: Today at 1459 hours HISTORY: ORDERING SYSTEM PROVIDED HISTORY: Left subclavian catheter placement TECHNOLOGIST PROVIDED HISTORY: Reason for exam:->Left subclavian catheter placement What reading provider will be dictating this exam?->CRC FINDINGS: Endotracheal tube is approximately 5 cm above the roberto. Left-sided central venous catheter is present with distal tip at location of SVC. Left PICC line is also present with distal tip not clearly identified. NG tube courses below the diaphragm. Stable interstitial opacities in perihilar locations and lung bases. No pneumothorax. 1. Status post placement of left subclavian central venous catheter with distal tip at location of SVC. 2. Endotracheal tube is approximately 5 cm above the roberto.  3. Stable bibasilar opacities related to pneumonia or atelectasis with probable bilateral pleural effusions. XR CHEST PORTABLE    Result Date: 2022  EXAMINATION: ONE XRAY VIEW OF THE CHEST; ONE XRAY VIEW OF THE PELVIS 2022 3:11 pm COMPARISON: None. HISTORY: ORDERING SYSTEM PROVIDED HISTORY: Trauma TECHNOLOGIST PROVIDED HISTORY: Reason for exam:->Trauma What reading provider will be dictating this exam?->CRC FINDINGS: Heart size is normal.  ET, NG tubes and left-sided PICC line are appropriate. There are scattered parenchymal scars in the lungs. There are no acute infiltrates or effusions. There are no fractures. Hip joints appear symmetric and unremarkable. Pelvic ring is intact. No acute process       Discharge Exam:    Intubated  No eye opening  No response to pain  No cough  No gag  No spontaneous respirations  Hrt:  Regular rate/rhythm; no murmur  Lungs:  Fairly clear bilaterally  Abd:  Soft; hypoactive BS   Skin:  Warm/dry  Ext:  No edema    Disposition:     In process/preliminary results:  Outstanding Order Results       Date and Time Order Name Status Description    2022  8:45 PM Culture, Blood 2 Preliminary     2022  6:00 PM Culture, Urine Preliminary     2022  5:30 PM Culture, MRSA, Screening In process     2022  4:00 PM Culture, Blood 1 Preliminary             Patient Instructions: There are no discharge medications for this patient. Follow up:   No follow-up provider specified.      Signed:  Manav Wright MD  2022  2:25 AM

## 2022-06-03 NOTE — PROGRESS NOTES
[facility] contacted. They stated he is not a corners case and can be released to the  home.

## 2022-06-03 NOTE — DEATH NOTES
Death Pronouncement Note  Patient's Name: Karan Ratliff.   Patient's YOB: 1960  MRN Number: [id number]    Admitting Provider: Benny Fournier MD  Attending Provider: Benny Fournier MD    Patient was examined and the following were absent: Pulses, Blood Pressure and Respiratory effort    I declared the patient dead on 6/3/2022 at 11:40 AM    Preliminary Cause of Death: Stroke (cerebrum) [facility])     Electronically signed by Keisha Aragon MD on 6/3/22 at 11:40 AM EDT

## 2022-06-04 LAB
ORGANISM: ABNORMAL
URINE CULTURE, ROUTINE: NORMAL

## 2022-06-07 LAB
BLOOD CULTURE, ROUTINE: NORMAL
CULTURE, BLOOD 2: NORMAL